# Patient Record
Sex: MALE | Race: BLACK OR AFRICAN AMERICAN | NOT HISPANIC OR LATINO | URBAN - METROPOLITAN AREA
[De-identification: names, ages, dates, MRNs, and addresses within clinical notes are randomized per-mention and may not be internally consistent; named-entity substitution may affect disease eponyms.]

---

## 2024-04-24 ENCOUNTER — LAB (OUTPATIENT)
Dept: LAB | Facility: LAB | Age: 34
End: 2024-04-24
Payer: COMMERCIAL

## 2024-04-24 ENCOUNTER — OFFICE VISIT (OUTPATIENT)
Dept: PRIMARY CARE | Facility: CLINIC | Age: 34
End: 2024-04-24
Payer: COMMERCIAL

## 2024-04-24 VITALS
HEIGHT: 68 IN | BODY MASS INDEX: 35.77 KG/M2 | WEIGHT: 236 LBS | DIASTOLIC BLOOD PRESSURE: 80 MMHG | SYSTOLIC BLOOD PRESSURE: 132 MMHG | OXYGEN SATURATION: 98 % | HEART RATE: 73 BPM

## 2024-04-24 DIAGNOSIS — Z13.220 SCREENING FOR HYPERLIPIDEMIA: ICD-10-CM

## 2024-04-24 DIAGNOSIS — R29.818 SUSPECTED SLEEP APNEA: Primary | ICD-10-CM

## 2024-04-24 DIAGNOSIS — Z13.29 SCREENING FOR THYROID DISORDER: ICD-10-CM

## 2024-04-24 DIAGNOSIS — Z13.1 SCREENING FOR DIABETES MELLITUS: ICD-10-CM

## 2024-04-24 DIAGNOSIS — D17.0 LIPOMA OF HEAD: ICD-10-CM

## 2024-04-24 DIAGNOSIS — Z13.89 SCREENING FOR OBESITY: ICD-10-CM

## 2024-04-24 PROBLEM — E66.01 CLASS 2 SEVERE OBESITY DUE TO EXCESS CALORIES WITH SERIOUS COMORBIDITY AND BODY MASS INDEX (BMI) OF 35.0 TO 35.9 IN ADULT (MULTI): Status: ACTIVE | Noted: 2024-04-24

## 2024-04-24 PROBLEM — E66.812 CLASS 2 SEVERE OBESITY DUE TO EXCESS CALORIES WITH SERIOUS COMORBIDITY AND BODY MASS INDEX (BMI) OF 35.0 TO 35.9 IN ADULT: Status: ACTIVE | Noted: 2024-04-24

## 2024-04-24 LAB
ALBUMIN SERPL BCP-MCNC: 4.8 G/DL (ref 3.4–5)
ALP SERPL-CCNC: 45 U/L (ref 33–120)
ALT SERPL W P-5'-P-CCNC: 22 U/L (ref 10–52)
ANION GAP SERPL CALC-SCNC: 12 MMOL/L (ref 10–20)
AST SERPL W P-5'-P-CCNC: 19 U/L (ref 9–39)
BILIRUB SERPL-MCNC: 0.5 MG/DL (ref 0–1.2)
BUN SERPL-MCNC: 15 MG/DL (ref 6–23)
CALCIUM SERPL-MCNC: 10.1 MG/DL (ref 8.6–10.6)
CHLORIDE SERPL-SCNC: 103 MMOL/L (ref 98–107)
CHOLEST SERPL-MCNC: 142 MG/DL (ref 0–199)
CHOLESTEROL/HDL RATIO: 4
CO2 SERPL-SCNC: 29 MMOL/L (ref 21–32)
CREAT SERPL-MCNC: 1.01 MG/DL (ref 0.5–1.3)
EGFRCR SERPLBLD CKD-EPI 2021: >90 ML/MIN/1.73M*2
GLUCOSE SERPL-MCNC: 87 MG/DL (ref 74–99)
HDLC SERPL-MCNC: 35.2 MG/DL
LDLC SERPL CALC-MCNC: 76 MG/DL
NON HDL CHOLESTEROL: 107 MG/DL (ref 0–149)
POTASSIUM SERPL-SCNC: 4.4 MMOL/L (ref 3.5–5.3)
PROT SERPL-MCNC: 7.6 G/DL (ref 6.4–8.2)
SODIUM SERPL-SCNC: 140 MMOL/L (ref 136–145)
TRIGL SERPL-MCNC: 153 MG/DL (ref 0–149)
TSH SERPL-ACNC: 1.11 MIU/L (ref 0.44–3.98)
VLDL: 31 MG/DL (ref 0–40)

## 2024-04-24 PROCEDURE — 80061 LIPID PANEL: CPT

## 2024-04-24 PROCEDURE — 80053 COMPREHEN METABOLIC PANEL: CPT

## 2024-04-24 PROCEDURE — 99202 OFFICE O/P NEW SF 15 MIN: CPT | Performed by: INTERNAL MEDICINE

## 2024-04-24 PROCEDURE — 36415 COLL VENOUS BLD VENIPUNCTURE: CPT

## 2024-04-24 PROCEDURE — 84443 ASSAY THYROID STIM HORMONE: CPT

## 2024-04-24 PROCEDURE — 1036F TOBACCO NON-USER: CPT | Performed by: INTERNAL MEDICINE

## 2024-04-24 ASSESSMENT — PATIENT HEALTH QUESTIONNAIRE - PHQ9
2. FEELING DOWN, DEPRESSED OR HOPELESS: NOT AT ALL
SUM OF ALL RESPONSES TO PHQ9 QUESTIONS 1 AND 2: 0
1. LITTLE INTEREST OR PLEASURE IN DOING THINGS: NOT AT ALL
1. LITTLE INTEREST OR PLEASURE IN DOING THINGS: NOT AT ALL
SUM OF ALL RESPONSES TO PHQ9 QUESTIONS 1 AND 2: 0
2. FEELING DOWN, DEPRESSED OR HOPELESS: NOT AT ALL

## 2024-04-24 ASSESSMENT — ENCOUNTER SYMPTOMS: CONSTITUTIONAL NEGATIVE: 1

## 2024-04-24 NOTE — PROGRESS NOTES
"Patient ID: Howie Savage is a 33 y.o. male who presents for sleep study (Is a  and needs to be checked) and Mass (On top of head).    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    All other systems reviewed and are negative.      Objective     Physical Exam  Vitals and nursing note reviewed.   Constitutional:       Comments: MULTIPLE LIPOMAS SCALP         No results found for: \"WBC\", \"HGB\", \"HCT\", \"MCV\", \"PLT\"        Problem List Items Addressed This Visit       Lipoma of head    Screening for obesity     Other Visit Diagnoses       Suspected sleep apnea    -  Primary    Relevant Orders    Referral to Adult Sleep Medicine    Screening for thyroid disorder        Relevant Orders    Tsh With Reflex To Free T4 If Abnormal    Screening for diabetes mellitus        Relevant Orders    Comprehensive metabolic panel    Screening for hyperlipidemia        Relevant Orders    Lipid panel                A/P         CMP,LIPID,TSH   REFFERAL SLEEP MEDICINE   NEED TO LOOSE WEIGHT      "

## 2024-06-04 NOTE — PROGRESS NOTES
"[facility]  New Visit Note        HISTORY OF PRESENT ILLNESS     The patient's referring provider is: Tori Hayes MD    HISTORY OF PRESENT ILLNESS   Howie Savage is a 33 y.o. male who presents to a [facility] for a sleep medicine evaluation with concerns of No chief complaint on file..     PAST SLEEP HISTORY    Patient has the following sleep-related diagnoses and sleep study results: ***    CURRENT HISTORY    On today's visit, the patient reports ***    STOP  ***  BANG ***    Sleep schedule  on weekdays / work days:  Usual Bedtime  ***  Falls asleep around  ***  Wake time  ***    Sleep schedule  on weekends/non work days :  Usual Bedtime  ***  Falls asleep around ***  Wake time  ***    Naps:   ***    Average sleep duration *** hours/day    Preferred sleeping position: ***    Sleep-related ROS:    Sleep Initiation: {Sleep initiation:78989}    Sleep Maintenance: ***    Recreational drug use  Smoking: ***  Alcohol consumption: ***  Caffeine consumption:  ***  Marijuana: ***    ESS: No data recorded      PHYSICAL EXAM     VITAL SIGNS: There were no vitals taken for this visit.     CURRENT WEIGHT: [unfilled]  BMI: [unfilled]  PREVIOUS WEIGHTS:  Wt Readings from Last 3 Encounters:   04/24/24 107 kg (236 lb)       PHYSICAL EXAM: GENERAL: alert oriented x 3 pleasant and cooperative no acute distress  MODIFIED MALLAMPATI SCORE: ***  LATERAL PHARYNGEAL WALL: ***  NECK EXAM: normal supple no adenopathy    RESULTS/DATA     No results found for: \"IRON\", \"TRANSFERRIN\", \"IRONSAT\", \"TIBC\", \"FERRITIN\"    ASSESSMENT/PLAN     Mr. Savage is a 33 y.o. male and was referred to the [facility] for the following issues:    ***       "

## 2024-06-06 ENCOUNTER — APPOINTMENT (OUTPATIENT)
Dept: SLEEP MEDICINE | Facility: CLINIC | Age: 34
End: 2024-06-06
Payer: COMMERCIAL

## 2024-06-07 NOTE — PROGRESS NOTES
"[facility]  New Visit Note        HISTORY OF PRESENT ILLNESS     The patient's referring provider is: Tori Hayes MD    HISTORY OF PRESENT ILLNESS   Howie Savage is a 33 y.o. male who presents to a [facility] for a sleep medicine evaluation with concerns of No chief complaint on file..     PAST SLEEP HISTORY    Patient has the following sleep-related diagnoses and sleep study results: severe NINA per HSAT with AHI 39.1 - he needed sleep study due to weight    CURRENT HISTORY    On today's visit, the patient reports he had a home sleep study through the [facility] for work clearance.  He is trying to get a CPAP machine to meet compliance as soon as possible.    Snores at night, denies choking/gasping or witnessed apnea.    Sleep schedule  on weekdays / work days:  Usual Bedtime  2 p  Falls asleep around  2-3 p  Wake time  8-9 a    Sleep schedule  on weekends/non work days :  Usual Bedtime  11 p  Falls asleep around 12 a  Wake time  8 am    Naps:   no    Average sleep duration 4-7 hours/day    Preferred sleeping position: side, stomach    Sleep-related ROS:    Sleep Initiation: denies issues    Sleep Maintenance: denies problem    Recreational drug use  Smoking: quit 2022  Alcohol consumption: 1/week  Caffeine consumption:  occasional  Marijuana: past use    ESS: 5      PHYSICAL EXAM     VITAL SIGNS: There were no vitals taken for this visit.     CURRENT WEIGHT: [unfilled]  BMI: [unfilled]  PREVIOUS WEIGHTS:  Wt Readings from Last 3 Encounters:   04/24/24 107 kg (236 lb)       PHYSICAL EXAM: GENERAL: alert oriented x 3 pleasant and cooperative no acute distress  MODIFIED MALLAMPATI SCORE: 2+  LATERAL PHARYNGEAL WALL: 2+  NECK EXAM: normal supple no adenopathy    RESULTS/DATA     No results found for: \"IRON\", \"TRANSFERRIN\", \"IRONSAT\", \"TIBC\", \"FERRITIN\"    ASSESSMENT/PLAN     Mr. Savage is a 33 y.o. male and was referred to the [facility] for the following issues:    OBSTRUCTIVE SLEEP APNEA / SLEEPINESS / FREQUENT WAKING  -Reviewed test results with patient  -Will order new CPAP from [facility] with pressure 5-15 cm H2O  -Discussed mask options and common tolerance issues  -Goal of CPAP includes less daytime sleepiness, better sleep quality    BMI>30  -Body mass index is 35.44 kg/m².  today  -With sufficient weight loss may no longer require treatment for NINA    ELEVATED BP  BP Readings from Last 3 Encounters:   06/10/24 (!) 142/99   04/24/24 132/80      -Elevated at last 2 appointments  -Suspect will benefit from CPAP    Followup 31-90 days after starting CPAP

## 2024-06-10 ENCOUNTER — OFFICE VISIT (OUTPATIENT)
Dept: SLEEP MEDICINE | Facility: CLINIC | Age: 34
End: 2024-06-10
Payer: COMMERCIAL

## 2024-06-10 VITALS
OXYGEN SATURATION: 99 % | WEIGHT: 240 LBS | DIASTOLIC BLOOD PRESSURE: 99 MMHG | HEART RATE: 71 BPM | HEIGHT: 69 IN | BODY MASS INDEX: 35.55 KG/M2 | SYSTOLIC BLOOD PRESSURE: 142 MMHG

## 2024-06-10 DIAGNOSIS — R03.0 ELEVATED BLOOD PRESSURE READING: ICD-10-CM

## 2024-06-10 DIAGNOSIS — R29.818 SUSPECTED SLEEP APNEA: ICD-10-CM

## 2024-06-10 DIAGNOSIS — G47.33 OBSTRUCTIVE SLEEP APNEA SYNDROME: Primary | ICD-10-CM

## 2024-06-10 PROCEDURE — 99203 OFFICE O/P NEW LOW 30 MIN: CPT | Performed by: PHYSICIAN ASSISTANT

## 2024-06-10 PROCEDURE — 1036F TOBACCO NON-USER: CPT | Performed by: PHYSICIAN ASSISTANT

## 2024-06-10 ASSESSMENT — PAIN SCALES - GENERAL: PAINLEVEL: 0-NO PAIN

## 2024-06-10 NOTE — PATIENT INSTRUCTIONS
Good seeing you today,    Order sent to [facility] with auto pressure 5-15 cm H2O. If you feel uncomfortable with pressure settings let me know and I can change them online.    Some mask options I recommend    Resmed N30i nasal mask (tube on top of head) - good option if you toss and turn a lot  Resmed N30 nasal mask (tube in front)  Daniel david fx nasal pillow mask (tube in front)  Resmed P10 nasal pillow mask (tube in front)  Resmed P30i nasal mask (tube on top of head) - good option if you toss and turn a lot    You can change humidity settings based on comfort    We will plan on seeing you back in 31-90 days for a required compliance appointment. Try to use at least 4 hours per night for >70% of nights.    Wei Toledo PA-C    IMPORTANT PHONE NUMBERS     [phone] ([id number])  [employer] [facility]): [phone]  For clinical questions and refilling prescriptions: [phone]  Ladonna Hernández (For [place]): P: [phone]

## 2024-06-10 NOTE — LETTER
To whom it may concern:    I saw Howie Savage today and we are ordering a CPAP machine to get him on treatment as soon as possible.    Please reach out to me if you have any questions,  Wei Toledo PA-C    IMPORTANT PHONE NUMBERS     [phone] ([id number])  Ladonna Hernández (For [place]): P: [phone]

## 2024-11-06 ENCOUNTER — APPOINTMENT (OUTPATIENT)
Dept: PRIMARY CARE | Facility: CLINIC | Age: 34
End: 2024-11-06
Payer: COMMERCIAL

## 2024-12-04 ENCOUNTER — APPOINTMENT (OUTPATIENT)
Dept: PRIMARY CARE | Facility: CLINIC | Age: 34
End: 2024-12-04
Payer: COMMERCIAL

## 2025-01-22 ENCOUNTER — APPOINTMENT (OUTPATIENT)
Dept: PRIMARY CARE | Facility: CLINIC | Age: 35
End: 2025-01-22
Payer: COMMERCIAL

## 2025-01-22 VITALS
DIASTOLIC BLOOD PRESSURE: 84 MMHG | HEIGHT: 69 IN | BODY MASS INDEX: 37.65 KG/M2 | WEIGHT: 254.2 LBS | HEART RATE: 81 BPM | OXYGEN SATURATION: 95 % | SYSTOLIC BLOOD PRESSURE: 140 MMHG

## 2025-01-22 DIAGNOSIS — E66.812 CLASS 2 SEVERE OBESITY DUE TO EXCESS CALORIES WITH SERIOUS COMORBIDITY AND BODY MASS INDEX (BMI) OF 37.0 TO 37.9 IN ADULT: ICD-10-CM

## 2025-01-22 DIAGNOSIS — I10 HYPERTENSION, UNSPECIFIED TYPE: ICD-10-CM

## 2025-01-22 DIAGNOSIS — I10 HYPERTENSION, UNSPECIFIED TYPE: Primary | ICD-10-CM

## 2025-01-22 DIAGNOSIS — Z13.89 SCREENING FOR OBESITY: ICD-10-CM

## 2025-01-22 DIAGNOSIS — E78.5 HYPERLIPIDEMIA, UNSPECIFIED HYPERLIPIDEMIA TYPE: ICD-10-CM

## 2025-01-22 DIAGNOSIS — E66.01 CLASS 2 SEVERE OBESITY DUE TO EXCESS CALORIES WITH SERIOUS COMORBIDITY AND BODY MASS INDEX (BMI) OF 37.0 TO 37.9 IN ADULT: ICD-10-CM

## 2025-01-22 PROCEDURE — 3008F BODY MASS INDEX DOCD: CPT | Performed by: INTERNAL MEDICINE

## 2025-01-22 PROCEDURE — 3079F DIAST BP 80-89 MM HG: CPT | Performed by: INTERNAL MEDICINE

## 2025-01-22 PROCEDURE — 3077F SYST BP >= 140 MM HG: CPT | Performed by: INTERNAL MEDICINE

## 2025-01-22 PROCEDURE — 1036F TOBACCO NON-USER: CPT | Performed by: INTERNAL MEDICINE

## 2025-01-22 PROCEDURE — 99214 OFFICE O/P EST MOD 30 MIN: CPT | Performed by: INTERNAL MEDICINE

## 2025-01-22 RX ORDER — AMLODIPINE BESYLATE 2.5 MG/1
2.5 TABLET ORAL DAILY
Qty: 30 TABLET | Refills: 2 | Status: SHIPPED | OUTPATIENT
Start: 2025-01-22

## 2025-01-22 RX ORDER — AMLODIPINE BESYLATE 2.5 MG/1
2.5 TABLET ORAL DAILY
Qty: 30 TABLET | Refills: 2 | Status: SHIPPED | OUTPATIENT
Start: 2025-01-22 | End: 2025-01-22 | Stop reason: SDUPTHER

## 2025-01-22 ASSESSMENT — ENCOUNTER SYMPTOMS: CONSTITUTIONAL NEGATIVE: 1

## 2025-01-22 ASSESSMENT — PATIENT HEALTH QUESTIONNAIRE - PHQ9
SUM OF ALL RESPONSES TO PHQ9 QUESTIONS 1 AND 2: 0
1. LITTLE INTEREST OR PLEASURE IN DOING THINGS: NOT AT ALL
2. FEELING DOWN, DEPRESSED OR HOPELESS: NOT AT ALL

## 2025-01-22 NOTE — PROGRESS NOTES
"Patient ID: Howie Savage is a 34 y.o. male who presents for Annual Exam (High blood pressure ).    /84   Pulse 81   Ht 1.753 m (5' 9\")   Wt 115 kg (254 lb 3.2 oz)   SpO2 95%   BMI 37.54 kg/m²     HPI        Patient is here for blood pressure check    He has noted to have a high blood pressure while he was getting his physical at [facility]    He does not have any symptoms  No chest pain, no shortness of breath, no PND, no orthopnea,  No leg swelling, nape no palpitation, no headache,      He recently has been diagnosed with severe obstructive sleep apnea  On CPAP  Doing reasonably well      He is morbidly obese      Subjective     Review of Systems   Constitutional: Negative.    All other systems reviewed and are negative.      Objective     Physical Exam  Vitals and nursing note reviewed.   Constitutional:       Appearance: Normal appearance. He is obese.   Neck:      Vascular: No carotid bruit.   Cardiovascular:      Rate and Rhythm: Normal rate and regular rhythm.      Pulses: Normal pulses.      Heart sounds: Normal heart sounds.   Pulmonary:      Effort: Pulmonary effort is normal.      Breath sounds: Normal breath sounds.   Abdominal:      Palpations: There is no mass.   Musculoskeletal:      Right lower leg: No edema.      Left lower leg: No edema.   Skin:     Capillary Refill: Capillary refill takes more than 3 seconds.   Neurological:      General: No focal deficit present.      Mental Status: He is oriented to person, place, and time. Mental status is at baseline.   Psychiatric:         Mood and Affect: Mood normal.         Behavior: Behavior normal.         Thought Content: Thought content normal.         Judgment: Judgment normal.         No results found for: \"WBC\", \"HGB\", \"HCT\", \"MCV\", \"PLT\"        Problem List Items Addressed This Visit       Screening for obesity    Class 2 severe obesity due to excess calories with serious comorbidity and body mass index (BMI) of 37.0 to 37.9 in adult "     Other Visit Diagnoses       Hypertension, unspecified type    -  Primary    Relevant Medications    amLODIPine (Norvasc) 2.5 mg tablet    Other Relevant Orders    Comprehensive metabolic panel    Hyperlipidemia, unspecified hyperlipidemia type        Relevant Orders    Lipid panel                A/P         Will start him on low-dose of amlodipine 2.5 mg  Will recheck blood pressure in a month or 2  Discussed with the patient the effect of morbid obesity and overall all-cause mortality  Discussed with the patient the effect of morbid obesity and physical mental wellbeing  Discussed with the patient the effect of morbid obesity and hypertension,diabetes, obstructive sleep apnea fatal arrhythmias and sudden death  Discussed with the patient the effect of morbid obesity and cardiovascular cerebrovascular health  Discussed with the patient the effect of morbid obesity and chronic kidney health, depression, cancer/malignancy  Advised to cut back total calories intake and total portion size  Advised to eat more healthy

## 2025-02-26 ENCOUNTER — APPOINTMENT (OUTPATIENT)
Dept: PRIMARY CARE | Facility: CLINIC | Age: 35
End: 2025-02-26
Payer: COMMERCIAL

## 2025-02-26 VITALS
BODY MASS INDEX: 37.83 KG/M2 | DIASTOLIC BLOOD PRESSURE: 78 MMHG | HEIGHT: 69 IN | SYSTOLIC BLOOD PRESSURE: 138 MMHG | HEART RATE: 74 BPM | WEIGHT: 255.4 LBS

## 2025-02-26 DIAGNOSIS — Z13.89 SCREENING FOR OBESITY: ICD-10-CM

## 2025-02-26 DIAGNOSIS — Z09 FOLLOW-UP EXAMINATION: Primary | Chronic | ICD-10-CM

## 2025-02-26 DIAGNOSIS — E66.01 CLASS 2 SEVERE OBESITY DUE TO EXCESS CALORIES WITH SERIOUS COMORBIDITY AND BODY MASS INDEX (BMI) OF 37.0 TO 37.9 IN ADULT: ICD-10-CM

## 2025-02-26 DIAGNOSIS — I10 HYPERTENSION, UNSPECIFIED TYPE: ICD-10-CM

## 2025-02-26 DIAGNOSIS — E66.812 CLASS 2 SEVERE OBESITY DUE TO EXCESS CALORIES WITH SERIOUS COMORBIDITY AND BODY MASS INDEX (BMI) OF 37.0 TO 37.9 IN ADULT: ICD-10-CM

## 2025-02-26 PROCEDURE — 1036F TOBACCO NON-USER: CPT | Performed by: INTERNAL MEDICINE

## 2025-02-26 PROCEDURE — 3075F SYST BP GE 130 - 139MM HG: CPT | Performed by: INTERNAL MEDICINE

## 2025-02-26 PROCEDURE — 99214 OFFICE O/P EST MOD 30 MIN: CPT | Performed by: INTERNAL MEDICINE

## 2025-02-26 PROCEDURE — 3078F DIAST BP <80 MM HG: CPT | Performed by: INTERNAL MEDICINE

## 2025-02-26 PROCEDURE — 3008F BODY MASS INDEX DOCD: CPT | Performed by: INTERNAL MEDICINE

## 2025-02-26 RX ORDER — AMLODIPINE BESYLATE 2.5 MG/1
2.5 TABLET ORAL DAILY
Qty: 90 TABLET | Refills: 1 | Status: SHIPPED | OUTPATIENT
Start: 2025-02-26

## 2025-02-26 ASSESSMENT — PATIENT HEALTH QUESTIONNAIRE - PHQ9
SUM OF ALL RESPONSES TO PHQ9 QUESTIONS 1 AND 2: 0
2. FEELING DOWN, DEPRESSED OR HOPELESS: NOT AT ALL
1. LITTLE INTEREST OR PLEASURE IN DOING THINGS: NOT AT ALL

## 2025-02-26 ASSESSMENT — ENCOUNTER SYMPTOMS: CONSTITUTIONAL NEGATIVE: 1

## 2025-02-26 NOTE — PROGRESS NOTES
"Patient ID: Howie Savage is a 34 y.o. male who presents for Follow-up.    /78   Pulse 74   Ht 1.753 m (5' 9\")   Wt 116 kg (255 lb 6.4 oz)   BMI 37.72 kg/m²     HPI        HTN ON MEDICATION CONTROLLED   NO CP, NO SOB , NO PND , NO ORTHOPNEA  NO LEG SWELLING , NO PALPITATION , NO HEADACHE   HE RAN OUT OF BP MEDS         MORBIDLY OBESE   NO APNEA, NO SNORING , NO GASPING   OR CHOKING FOR AIR AT NIGHT   NO DAYTIME SOMNOLENCE   NO AM HEADACHE           Subjective     Review of Systems   Constitutional: Negative.    All other systems reviewed and are negative.      Objective     Physical Exam  Vitals and nursing note reviewed.   Constitutional:       Appearance: Normal appearance. He is obese.   Neck:      Vascular: No carotid bruit.   Cardiovascular:      Rate and Rhythm: Normal rate and regular rhythm.      Pulses: Normal pulses.      Heart sounds: Normal heart sounds. No murmur heard.  Pulmonary:      Effort: Pulmonary effort is normal.      Breath sounds: Normal breath sounds.   Abdominal:      Palpations: There is no mass.      Comments: OBESE   Musculoskeletal:      Right lower leg: No edema.      Left lower leg: No edema.   Skin:     Capillary Refill: Capillary refill takes more than 3 seconds.   Neurological:      General: No focal deficit present.      Mental Status: He is oriented to person, place, and time. Mental status is at baseline.   Psychiatric:         Mood and Affect: Mood normal.         Behavior: Behavior normal.         Thought Content: Thought content normal.         Judgment: Judgment normal.         No results found for: \"WBC\", \"HGB\", \"HCT\", \"MCV\", \"PLT\"        Problem List Items Addressed This Visit       Screening for obesity    Class 2 severe obesity due to excess calories with serious comorbidity and body mass index (BMI) of 37.0 to 37.9 in adult     Other Visit Diagnoses       Follow-up examination  (Chronic)   -  Primary    Relevant Orders    Comprehensive metabolic panel    " Lipid panel    Hypertension, unspecified type        Relevant Medications    amLODIPine (Norvasc) 2.5 mg tablet                  A/P         AMLODIPINE 2.5MG 1 I TABLET EVERY DAY FILLED   CMP, LIPID   FOLLOW UP IN 6 MONTHS TIME   Discussed with the patient the effect of morbid obesity and overall all-cause mortality  Discussed with the patient the effect of morbid obesity and physical mental wellbeing  Discussed with the patient the effect of morbid obesity and cardiovascular and cerebrovascular health  Discussed with the patient the effect of morbid obesity and hypertension,diabetes , obstructive sleep apnea fatal arrhythmias and sudden death  Discussed with the patient the effect of morbid obesity on cancer/malignancy, chronic kidney health chronic pain depression  Advised him to cut back total calories intake and total portion size

## 2025-02-27 LAB
ALBUMIN SERPL-MCNC: 5 G/DL (ref 3.6–5.1)
ALP SERPL-CCNC: 40 U/L (ref 36–130)
ALT SERPL-CCNC: 22 U/L (ref 9–46)
ANION GAP SERPL CALCULATED.4IONS-SCNC: 7 MMOL/L (CALC) (ref 7–17)
AST SERPL-CCNC: 17 U/L (ref 10–40)
BILIRUB SERPL-MCNC: 0.4 MG/DL (ref 0.2–1.2)
BUN SERPL-MCNC: 15 MG/DL (ref 7–25)
CALCIUM SERPL-MCNC: 10 MG/DL (ref 8.6–10.3)
CHLORIDE SERPL-SCNC: 101 MMOL/L (ref 98–110)
CHOLEST SERPL-MCNC: 156 MG/DL
CHOLEST/HDLC SERPL: 4.7 (CALC)
CO2 SERPL-SCNC: 29 MMOL/L (ref 20–32)
CREAT SERPL-MCNC: 0.84 MG/DL (ref 0.6–1.26)
EGFRCR SERPLBLD CKD-EPI 2021: 117 ML/MIN/1.73M2
GLUCOSE SERPL-MCNC: 101 MG/DL (ref 65–139)
HDLC SERPL-MCNC: 33 MG/DL
LDLC SERPL CALC-MCNC: 96 MG/DL (CALC)
NONHDLC SERPL-MCNC: 123 MG/DL (CALC)
POTASSIUM SERPL-SCNC: 5.2 MMOL/L (ref 3.5–5.3)
PROT SERPL-MCNC: 7.3 G/DL (ref 6.1–8.1)
SODIUM SERPL-SCNC: 137 MMOL/L (ref 135–146)
TRIGL SERPL-MCNC: 178 MG/DL

## 2025-08-28 ENCOUNTER — APPOINTMENT (OUTPATIENT)
Dept: PRIMARY CARE | Facility: CLINIC | Age: 35
End: 2025-08-28
Payer: COMMERCIAL